# Patient Record
Sex: MALE | URBAN - METROPOLITAN AREA
[De-identification: names, ages, dates, MRNs, and addresses within clinical notes are randomized per-mention and may not be internally consistent; named-entity substitution may affect disease eponyms.]

---
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
## 2020-08-04 ENCOUNTER — APPOINTMENT (RX ONLY)
Dept: URBAN - METROPOLITAN AREA CLINIC 115 | Facility: CLINIC | Age: 20
Setting detail: DERMATOLOGY
End: 2020-08-04

## 2020-08-04 DIAGNOSIS — L65.9 NONSCARRING HAIR LOSS, UNSPECIFIED: ICD-10-CM

## 2020-08-04 DIAGNOSIS — L21.8 OTHER SEBORRHEIC DERMATITIS: ICD-10-CM | Status: INADEQUATELY CONTROLLED

## 2020-08-04 PROBLEM — L91.0 HYPERTROPHIC SCAR: Status: ACTIVE | Noted: 2020-08-04

## 2020-08-04 PROBLEM — L70.0 ACNE VULGARIS: Status: ACTIVE | Noted: 2020-08-04

## 2020-08-04 PROCEDURE — 99213 OFFICE O/P EST LOW 20 MIN: CPT

## 2020-08-04 PROCEDURE — ? COUNSELING

## 2020-08-04 PROCEDURE — ? TREATMENT REGIMEN

## 2020-08-04 PROCEDURE — ? PRESCRIPTION

## 2020-08-04 RX ORDER — CLOBETASOL PROPIONATE 0.05 G/100ML
SHAMPOO TOPICAL
Qty: 1 | Refills: 3 | Status: ERX | COMMUNITY
Start: 2020-08-04

## 2020-08-04 RX ORDER — FLUCONAZOLE 200 MG/1
TABLET ORAL
Qty: 14 | Refills: 1 | Status: ERX | COMMUNITY
Start: 2020-08-04

## 2020-08-04 RX ORDER — CICLOPIROX 10 MG/.96ML
SHAMPOO TOPICAL
Qty: 1 | Refills: 5 | Status: ERX | COMMUNITY
Start: 2020-08-04

## 2020-08-04 RX ADMIN — CLOBETASOL PROPIONATE: 0.05 SHAMPOO TOPICAL at 00:00

## 2020-08-04 RX ADMIN — CICLOPIROX: 10 SHAMPOO TOPICAL at 00:00

## 2020-08-04 RX ADMIN — FLUCONAZOLE: 200 TABLET ORAL at 00:00

## 2020-08-04 ASSESSMENT — LOCATION SIMPLE DESCRIPTION DERM
LOCATION SIMPLE: LEFT SCALP
LOCATION SIMPLE: SUBMENTAL CHIN
LOCATION SIMPLE: LEFT EAR
LOCATION SIMPLE: RIGHT FOREHEAD
LOCATION SIMPLE: LEFT FOREHEAD
LOCATION SIMPLE: RIGHT EAR
LOCATION SIMPLE: LEFT CHEEK
LOCATION SIMPLE: SCALP
LOCATION SIMPLE: RIGHT EYEBROW
LOCATION SIMPLE: RIGHT CHEEK
LOCATION SIMPLE: LEFT EYEBROW
LOCATION SIMPLE: CHEST

## 2020-08-04 ASSESSMENT — LOCATION DETAILED DESCRIPTION DERM
LOCATION DETAILED: RIGHT CENTRAL EYEBROW
LOCATION DETAILED: LEFT ANTIHELIX
LOCATION DETAILED: RIGHT SUPERIOR FOREHEAD
LOCATION DETAILED: LEFT SUPERIOR LATERAL FOREHEAD
LOCATION DETAILED: LEFT SUPERIOR MEDIAL BUCCAL CHEEK
LOCATION DETAILED: STERNUM
LOCATION DETAILED: RIGHT MEDIAL MALAR CHEEK
LOCATION DETAILED: LEFT CENTRAL EYEBROW
LOCATION DETAILED: LEFT CENTRAL POSTAURICULAR SKIN
LOCATION DETAILED: SUBMENTAL CHIN
LOCATION DETAILED: RIGHT CENTRAL POSTAURICULAR SKIN
LOCATION DETAILED: LEFT MEDIAL MALAR CHEEK
LOCATION DETAILED: LEFT MEDIAL FRONTAL SCALP
LOCATION DETAILED: RIGHT SUPERIOR LATERAL FOREHEAD
LOCATION DETAILED: RIGHT CENTRAL BUCCAL CHEEK
LOCATION DETAILED: LEFT CENTRAL BUCCAL CHEEK
LOCATION DETAILED: RIGHT MEDIAL FOREHEAD
LOCATION DETAILED: RIGHT CYMBA CONCHA

## 2020-08-04 ASSESSMENT — LOCATION ZONE DERM
LOCATION ZONE: TRUNK
LOCATION ZONE: EAR
LOCATION ZONE: FACE
LOCATION ZONE: SCALP

## 2020-08-04 NOTE — PROCEDURE: TREATMENT REGIMEN
Plan: Wash scalp, beard, ears, eye brows around corners of nose, mid chest
Continue Regimen: Finasteride
Detail Level: Zone
Plan: Suspect it’s from his [facility] derm,  is controlled recommend rogaine
Initiate Treatment: Ciclopirox shampoo 2x week, clobetasol shampoo 3x week, steroid 2 weeks max and Diflucan 200mg QD x 2 weeks

## 2020-09-18 ENCOUNTER — APPOINTMENT (RX ONLY)
Dept: URBAN - METROPOLITAN AREA CLINIC 115 | Facility: CLINIC | Age: 20
Setting detail: DERMATOLOGY
End: 2020-09-18

## 2020-09-18 VITALS — HEIGHT: 73 IN | WEIGHT: 170 LBS

## 2020-09-18 DIAGNOSIS — L40.0 PSORIASIS VULGARIS: ICD-10-CM

## 2020-09-18 PROCEDURE — ? ORDER TESTS

## 2020-09-18 PROCEDURE — ? COUNSELING

## 2020-09-18 PROCEDURE — 99214 OFFICE O/P EST MOD 30 MIN: CPT

## 2020-09-18 PROCEDURE — ? SKYRIZI INITIATION

## 2020-09-18 PROCEDURE — ? PRESCRIPTION

## 2020-09-18 RX ORDER — HALOBETASOL PROPIONATE 0.5 MG/G
AEROSOL, FOAM TOPICAL
Qty: 1 | Refills: 2 | Status: ERX | COMMUNITY
Start: 2020-09-18

## 2020-09-18 RX ADMIN — HALOBETASOL PROPIONATE: 0.5 AEROSOL, FOAM TOPICAL at 00:00

## 2020-09-18 ASSESSMENT — PGA PSORIASIS: PGA PSORIASIS 2020: SEVERE

## 2020-09-18 ASSESSMENT — BSA PSORIASIS: % BODY COVERED IN PSORIASIS: 25

## 2020-09-18 NOTE — PROCEDURE: SKYRIZI INITIATION
Is Cyclosporine Contraindicated?: No
Skyrizi Dosing: 150 mg SC week 0 and week 4 then every 12 weeks thereafter
Skyrizi Monitoring Guidelines: A yearly test for tuberculosis is required while taking Skyrizi.
Pregnancy And Lactation Warning Text: The risk during pregnancy and breastfeeding is uncertain with this medication.
Diagnosis (Required): Psoriasis
Detail Level: Zone

## 2020-12-11 ENCOUNTER — APPOINTMENT (RX ONLY)
Dept: URBAN - METROPOLITAN AREA CLINIC 115 | Facility: CLINIC | Age: 20
Setting detail: DERMATOLOGY
End: 2020-12-11

## 2020-12-11 DIAGNOSIS — L40.0 PSORIASIS VULGARIS: ICD-10-CM

## 2020-12-11 PROCEDURE — 96372 THER/PROPH/DIAG INJ SC/IM: CPT

## 2020-12-11 PROCEDURE — ? COUNSELING

## 2020-12-11 PROCEDURE — ? SKYRIZI INJECTION

## 2020-12-11 ASSESSMENT — LOCATION ZONE DERM: LOCATION ZONE: ARM

## 2020-12-11 ASSESSMENT — LOCATION SIMPLE DESCRIPTION DERM
LOCATION SIMPLE: LEFT UPPER ARM
LOCATION SIMPLE: RIGHT UPPER ARM

## 2020-12-11 ASSESSMENT — LOCATION DETAILED DESCRIPTION DERM
LOCATION DETAILED: LEFT DISTAL POSTERIOR UPPER ARM
LOCATION DETAILED: RIGHT DISTAL POSTERIOR UPPER ARM

## 2020-12-11 NOTE — PROCEDURE: SKYRIZI INJECTION
Ndc (75mg/0.83ml Syringe): [address]-22
Detail Level: Simple
Use Enhanced Ndc?: Yes
J-Code: 
Include J-Code In Bill: No
Consent: The risks of pain and injection site reactions were reviewed with the patient prior to the injection.
Skyrizi Amount: 150 mg
Treatment Number (Optional): 1
Administered By (Optional): Daniel
Syringe Size Used (Required For Enhanced Ndc): 75 mg/0.83ml Prefilled Syringe

## 2020-12-11 NOTE — HPI: MEDICATION (SKYRIZI)
Is This A New Presentation, Or A Follow-Up?: Follow Up Skyrizi
How Severe Is It?: mild
How Many Months Of Skyrizi Have You Completed?: 0

## 2020-12-11 NOTE — PROCEDURE: MIPS QUALITY
Detail Level: Generalized
Quality 431: Preventive Care And Screening: Unhealthy Alcohol Use - Screening: Patient screened for unhealthy alcohol use using a single question and scores less than 2 times per year
Quality 337: Tuberculosis Prevention For Psoriasis And Psoriatic Arthritis Patients On A Biological Immune Response Modifier: Patient has a documented negative annual TB screening.
Quality 402: Tobacco Use And Help With Quitting Among Adolescents: Patient screened for tobacco and never smoked

## 2021-01-08 ENCOUNTER — APPOINTMENT (RX ONLY)
Dept: URBAN - METROPOLITAN AREA CLINIC 115 | Facility: CLINIC | Age: 21
Setting detail: DERMATOLOGY
End: 2021-01-08

## 2021-01-08 VITALS — HEIGHT: 73 IN | WEIGHT: 170 LBS

## 2021-01-08 DIAGNOSIS — L40.0 PSORIASIS VULGARIS: ICD-10-CM | Status: IMPROVED

## 2021-01-08 PROCEDURE — ? COUNSELING

## 2021-01-08 PROCEDURE — ? SKYRIZI INJECTION

## 2021-01-08 PROCEDURE — 96372 THER/PROPH/DIAG INJ SC/IM: CPT

## 2021-01-08 ASSESSMENT — LOCATION SIMPLE DESCRIPTION DERM
LOCATION SIMPLE: LEFT UPPER ARM
LOCATION SIMPLE: RIGHT UPPER ARM

## 2021-01-08 ASSESSMENT — LOCATION ZONE DERM: LOCATION ZONE: ARM

## 2021-01-08 ASSESSMENT — PGA PSORIASIS: PGA PSORIASIS 2020: SEVERE

## 2021-01-08 NOTE — PROCEDURE: SKYRIZI INJECTION
Skyrizi Amount: 150 mg
Include J-Code In Bill: No
Consent: The risks of pain and injection site reactions were reviewed with the patient prior to the injection.
Ndc (75mg/0.83ml Syringe): [address]-87
84956 Billing Preferences: 1
Use Enhanced Ndc?: Yes
Administered By (Optional): Taylor
Syringe Size Used (Required For Enhanced Ndc): 75 mg/0.83ml Prefilled Syringe
J-Code: 
Treatment Number (Optional): 2
Detail Level: Simple

## 2021-01-08 NOTE — PROCEDURE: MIPS QUALITY
Quality 226: Preventive Care And Screening: Tobacco Use: Screening And Cessation Intervention: Patient screened for tobacco use and is an ex/non-smoker
Quality 337: Tuberculosis Prevention For Psoriasis And Psoriatic Arthritis Patients On A Biological Immune Response Modifier: Patient has a documented negative annual TB screening.
Detail Level: Generalized

## 2021-04-05 ENCOUNTER — APPOINTMENT (RX ONLY)
Dept: URBAN - METROPOLITAN AREA CLINIC 115 | Facility: CLINIC | Age: 21
Setting detail: DERMATOLOGY
End: 2021-04-05

## 2021-04-05 DIAGNOSIS — L40.0 PSORIASIS VULGARIS: ICD-10-CM

## 2021-04-05 PROBLEM — L70.8 OTHER ACNE: Status: ACTIVE | Noted: 2021-04-05

## 2021-04-05 PROCEDURE — 96372 THER/PROPH/DIAG INJ SC/IM: CPT

## 2021-04-05 PROCEDURE — ? SKYRIZI INJECTION

## 2021-04-05 PROCEDURE — ? COUNSELING

## 2021-04-05 ASSESSMENT — LOCATION DETAILED DESCRIPTION DERM
LOCATION DETAILED: RIGHT DISTAL POSTERIOR UPPER ARM
LOCATION DETAILED: LEFT DISTAL POSTERIOR UPPER ARM

## 2021-04-05 ASSESSMENT — LOCATION SIMPLE DESCRIPTION DERM
LOCATION SIMPLE: RIGHT UPPER ARM
LOCATION SIMPLE: LEFT UPPER ARM

## 2021-04-05 ASSESSMENT — LOCATION ZONE DERM: LOCATION ZONE: ARM

## 2021-04-05 NOTE — PROCEDURE: SKYRIZI INJECTION
Consent: The risks of pain and injection site reactions were reviewed with the patient prior to the injection.
Was The Medication Purchased By The Clinic?: No
Ndc (75mg/0.83ml Syringe): [address]-56
03959 Billing Preferences: 1
Use Enhanced Ndc?: Yes
Syringe Size Used (Required For Enhanced Ndc): 75 mg/0.83ml Prefilled Syringe
Administered By (Optional): Taylor
J-Code: 
Treatment Number (Optional): 2
Detail Level: Simple
Skyrizi Amount: 150 mg

## 2021-07-15 ENCOUNTER — APPOINTMENT (RX ONLY)
Dept: URBAN - METROPOLITAN AREA CLINIC 115 | Facility: CLINIC | Age: 21
Setting detail: DERMATOLOGY
End: 2021-07-15

## 2021-07-15 ENCOUNTER — RX ONLY (OUTPATIENT)
Age: 21
Setting detail: RX ONLY
End: 2021-07-15

## 2021-07-15 VITALS — HEIGHT: 73 IN | WEIGHT: 170 LBS

## 2021-07-15 DIAGNOSIS — L40.0 PSORIASIS VULGARIS: ICD-10-CM | Status: IMPROVED

## 2021-07-15 PROBLEM — L21.8 OTHER SEBORRHEIC DERMATITIS: Status: ACTIVE | Noted: 2021-07-15

## 2021-07-15 PROBLEM — L91.0 HYPERTROPHIC SCAR: Status: ACTIVE | Noted: 2021-07-15

## 2021-07-15 PROBLEM — L02.31 CUTANEOUS ABSCESS OF BUTTOCK: Status: ACTIVE | Noted: 2021-07-15

## 2021-07-15 PROCEDURE — ? TREATMENT REGIMEN

## 2021-07-15 PROCEDURE — 96372 THER/PROPH/DIAG INJ SC/IM: CPT

## 2021-07-15 PROCEDURE — ? ADDITIONAL NOTES

## 2021-07-15 PROCEDURE — ? PRESCRIPTION

## 2021-07-15 PROCEDURE — 99214 OFFICE O/P EST MOD 30 MIN: CPT | Mod: 25

## 2021-07-15 PROCEDURE — ? SKYRIZI MONITORING

## 2021-07-15 PROCEDURE — ? COUNSELING

## 2021-07-15 PROCEDURE — ? SKYRIZI INJECTION

## 2021-07-15 RX ORDER — KETOCONAZOLE 20 MG/ML
SHAMPOO, SUSPENSION TOPICAL
Qty: 1 | Refills: 0 | Status: ERX

## 2021-07-15 RX ORDER — KETOCONAZOLE 20 MG/ML
SHAMPOO, SUSPENSION TOPICAL
Qty: 1 | Refills: 0 | Status: CANCELLED | COMMUNITY
Start: 2021-07-15

## 2021-07-15 RX ORDER — HALOBETASOL PROPIONATE 0.1 MG/G
LOTION TOPICAL
Qty: 1 | Refills: 0 | Status: ERX | COMMUNITY
Start: 2021-07-15

## 2021-07-15 RX ADMIN — KETOCONAZOLE: 20 SHAMPOO, SUSPENSION TOPICAL at 00:00

## 2021-07-15 RX ADMIN — HALOBETASOL PROPIONATE: 0.1 LOTION TOPICAL at 00:00

## 2021-07-15 ASSESSMENT — LOCATION ZONE DERM
LOCATION ZONE: LEG
LOCATION ZONE: FACE
LOCATION ZONE: ARM
LOCATION ZONE: SCALP

## 2021-07-15 ASSESSMENT — LOCATION SIMPLE DESCRIPTION DERM
LOCATION SIMPLE: RIGHT THIGH
LOCATION SIMPLE: LEFT THIGH
LOCATION SIMPLE: RIGHT UPPER ARM
LOCATION SIMPLE: LEFT CHEEK
LOCATION SIMPLE: LEFT FOREHEAD
LOCATION SIMPLE: SCALP
LOCATION SIMPLE: RIGHT CHEEK
LOCATION SIMPLE: LEFT UPPER ARM
LOCATION SIMPLE: CHIN

## 2021-07-15 ASSESSMENT — BSA PSORIASIS: % BODY COVERED IN PSORIASIS: 25

## 2021-07-15 ASSESSMENT — LOCATION DETAILED DESCRIPTION DERM
LOCATION DETAILED: RIGHT ANTERIOR PROXIMAL THIGH
LOCATION DETAILED: LEFT ANTERIOR PROXIMAL THIGH
LOCATION DETAILED: RIGHT DISTAL POSTERIOR UPPER ARM
LOCATION DETAILED: RIGHT INFERIOR CENTRAL MALAR CHEEK
LOCATION DETAILED: LEFT DISTAL POSTERIOR UPPER ARM
LOCATION DETAILED: LEFT SUPERIOR MEDIAL FOREHEAD
LOCATION DETAILED: LEFT SUPERIOR PARIETAL SCALP
LOCATION DETAILED: LEFT CHIN
LOCATION DETAILED: LEFT SUPERIOR LATERAL BUCCAL CHEEK

## 2021-07-15 ASSESSMENT — PGA PSORIASIS: PGA PSORIASIS 2020: MODERATE

## 2021-07-15 NOTE — PROCEDURE: SKYRIZI INJECTION
Was The Medication Purchased By The Clinic?: No
Detail Level: Simple
Render If Medication Purchased By Clinic In Visit Note?: Yes
Administered By (Optional): addie isidro ma
Consent: The risks of pain and injection site reactions were reviewed with the patient prior to the injection.
34465 Billing Preferences: 1
Skyrizi Amount: 150 mg
Syringe Size Used (Required For Enhanced Ndc): 75 mg/0.83ml Prefilled Syringe
J-Code: 
Ndc (75mg/0.83ml Syringe): [address]-44

## 2021-07-15 NOTE — HPI: MEDICATION (SKYRIZI)
Is This A New Presentation, Or A Follow-Up?: Follow Up Skyrizi
How Severe Is It?: severe
How Many Months Of Skyrizi Have You Completed?: 6

## 2021-07-15 NOTE — PROCEDURE: MIPS QUALITY
Quality 226: Preventive Care And Screening: Tobacco Use: Screening And Cessation Intervention: Patient screened for tobacco use and is an ex/non-smoker
Detail Level: Generalized
Quality 431: Preventive Care And Screening: Unhealthy Alcohol Use - Screening: Patient screened for unhealthy alcohol use using a single question and scores less than 2 times per year
Quality 337: Tuberculosis Prevention For Psoriasis And Psoriatic Arthritis Patients On A Biological Immune Response Modifier: Patient has a documented negative annual TB screening.

## 2021-07-15 NOTE — PROCEDURE: TREATMENT REGIMEN
Continue Regimen: Skyrizi medication
Action 3: Continue
Initiate Regimen: Bryhali 0.01 % lotion \\nQuantity: 1.0 Tube  Days Supply: 30\\nSig: Apply lotion to affected areas QD prn flares\\n\\nketoconazole 2 % shampoo \\nQuantity: 1.0 Bottle  Days Supply: 30\\nSig: Apply shampoo to scalp 2-3 times weekly as needed for flares.
Plan: Patient had last BW done 10/2020 \\nNeeds CBC and tab by 10/2021\\nPatient says he just got blood done with his PCP and is going to follow up with him to see he still needs to get it done or not, if he has the results confirmed he will call [facility] and have BW sent over.
Detail Level: Zone

## 2021-07-15 NOTE — PROCEDURE: ADDITIONAL NOTES
Render Risk Assessment In Note?: no
Additional Notes: Patient has flares on scalp, face and groin area. \\nHas improved since starting medication.
Detail Level: Simple